# Patient Record
Sex: MALE | Race: BLACK OR AFRICAN AMERICAN | NOT HISPANIC OR LATINO | Employment: OTHER | URBAN - NONMETROPOLITAN AREA
[De-identification: names, ages, dates, MRNs, and addresses within clinical notes are randomized per-mention and may not be internally consistent; named-entity substitution may affect disease eponyms.]

---

## 2017-08-14 ENCOUNTER — OFFICE VISIT (OUTPATIENT)
Dept: CARDIAC SURGERY | Facility: CLINIC | Age: 72
End: 2017-08-14

## 2017-08-14 VITALS
SYSTOLIC BLOOD PRESSURE: 110 MMHG | DIASTOLIC BLOOD PRESSURE: 61 MMHG | WEIGHT: 191 LBS | TEMPERATURE: 97.8 F | HEIGHT: 67 IN | HEART RATE: 79 BPM | OXYGEN SATURATION: 96 % | BODY MASS INDEX: 29.98 KG/M2

## 2017-08-14 DIAGNOSIS — I70.219 ATHEROSCLEROSIS OF ARTERY OF EXTREMITY WITH INTERMITTENT CLAUDICATION (HCC): ICD-10-CM

## 2017-08-14 DIAGNOSIS — I35.9 AORTIC VALVE DISORDER: ICD-10-CM

## 2017-08-14 DIAGNOSIS — J43.1 PANLOBULAR EMPHYSEMA (HCC): ICD-10-CM

## 2017-08-14 DIAGNOSIS — E78.2 MIXED HYPERLIPIDEMIA: ICD-10-CM

## 2017-08-14 DIAGNOSIS — I65.23 BILATERAL CAROTID ARTERY STENOSIS: Primary | ICD-10-CM

## 2017-08-14 DIAGNOSIS — Z79.4 CONTROLLED TYPE 2 DIABETES MELLITUS WITH DIABETIC PERIPHERAL ANGIOPATHY WITHOUT GANGRENE, WITH LONG-TERM CURRENT USE OF INSULIN (HCC): ICD-10-CM

## 2017-08-14 DIAGNOSIS — E11.51 CONTROLLED TYPE 2 DIABETES MELLITUS WITH DIABETIC PERIPHERAL ANGIOPATHY WITHOUT GANGRENE, WITH LONG-TERM CURRENT USE OF INSULIN (HCC): ICD-10-CM

## 2017-08-14 PROCEDURE — 99204 OFFICE O/P NEW MOD 45 MIN: CPT | Performed by: THORACIC SURGERY (CARDIOTHORACIC VASCULAR SURGERY)

## 2017-08-14 RX ORDER — FERROUS SULFATE 325(65) MG
325 TABLET ORAL
COMMUNITY

## 2017-08-14 RX ORDER — DOCUSATE SODIUM 100 MG/1
100 CAPSULE, LIQUID FILLED ORAL 2 TIMES DAILY
COMMUNITY

## 2017-08-14 RX ORDER — POLYETHYLENE GLYCOL 3350 17 G/17G
17 POWDER, FOR SOLUTION ORAL DAILY
COMMUNITY

## 2017-08-14 RX ORDER — CLOPIDOGREL BISULFATE 75 MG/1
75 TABLET ORAL DAILY
COMMUNITY

## 2017-08-14 RX ORDER — OXYCODONE HYDROCHLORIDE AND ACETAMINOPHEN 5; 325 MG/1; MG/1
1 TABLET ORAL EVERY 6 HOURS PRN
COMMUNITY
End: 2017-09-28

## 2017-08-14 RX ORDER — SIMVASTATIN 40 MG
40 TABLET ORAL NIGHTLY
COMMUNITY

## 2017-08-14 RX ORDER — ALBUTEROL SULFATE 2.5 MG/3ML
2.5 SOLUTION RESPIRATORY (INHALATION) EVERY 4 HOURS PRN
COMMUNITY

## 2017-08-14 RX ORDER — BUDESONIDE 0.5 MG/2ML
0.5 INHALANT ORAL
COMMUNITY

## 2017-08-14 RX ORDER — LANOLIN ALCOHOL/MO/W.PET/CERES
1000 CREAM (GRAM) TOPICAL DAILY
COMMUNITY

## 2017-08-14 RX ORDER — FAMOTIDINE 20 MG/1
20 TABLET, FILM COATED ORAL 2 TIMES DAILY
COMMUNITY

## 2017-08-14 RX ORDER — PAROXETINE HYDROCHLORIDE 20 MG/1
20 TABLET, FILM COATED ORAL EVERY MORNING
COMMUNITY

## 2017-08-14 RX ORDER — ARFORMOTEROL TARTRATE 15 UG/2ML
SOLUTION RESPIRATORY (INHALATION)
COMMUNITY

## 2017-09-03 PROBLEM — I35.9 AORTIC VALVE DISORDER: Status: ACTIVE | Noted: 2017-09-03

## 2017-09-03 PROBLEM — I65.23 BILATERAL CAROTID ARTERY STENOSIS: Status: ACTIVE | Noted: 2017-09-03

## 2017-09-03 PROBLEM — E11.51 CONTROLLED TYPE 2 DIABETES MELLITUS WITH DIABETIC PERIPHERAL ANGIOPATHY WITHOUT GANGRENE, WITH LONG-TERM CURRENT USE OF INSULIN (HCC): Status: ACTIVE | Noted: 2017-09-03

## 2017-09-03 PROBLEM — I70.219 ATHEROSCLEROSIS OF ARTERY OF EXTREMITY WITH INTERMITTENT CLAUDICATION (HCC): Status: ACTIVE | Noted: 2017-09-03

## 2017-09-03 PROBLEM — Z79.4 CONTROLLED TYPE 2 DIABETES MELLITUS WITH DIABETIC PERIPHERAL ANGIOPATHY WITHOUT GANGRENE, WITH LONG-TERM CURRENT USE OF INSULIN (HCC): Status: ACTIVE | Noted: 2017-09-03

## 2017-09-03 PROBLEM — E78.2 MIXED HYPERLIPIDEMIA: Status: ACTIVE | Noted: 2017-09-03

## 2017-09-03 PROBLEM — J43.1 PANLOBULAR EMPHYSEMA (HCC): Status: ACTIVE | Noted: 2017-09-03

## 2017-09-04 NOTE — PROGRESS NOTES
8/14/2017    Sarbjit Kirkpatrick  1945      Chief Complaint:    Chief Complaint   Patient presents with   • Carotid Artery Disease       HPI:      PCP:  J[facility] Rivera  Cardiology:  Dr Grant  Neurology:  Dr Gaspar      72 y.o. male with HTN(stable, risk stroke), hyperlipidemia(stable, risk stroke), carotid stenosis(new, risk stroke), COPD(stable, risk procedural complications), DM2(stable, risk wound healing).  former smoker.  Moderate fatigue, lightheadedness x 6 months.. No TIA stroke amaurosis.  No MI claudication. Cholecystectomy 7/26/2017, Dr Jorgensen.  No other associated signs, symptoms or modifying factors.    1980 LEFT Carotid endarterectomy  11/8/2016 US Abdomen([facility]): gallstones, possible common duct stone.  6/2/2017 CTA Head/Neck([facility]):  ERICH occlusion.  LICA 40-50% stenosis, prior endarterectomy.  6/2/2017 MRI Brain([facility]):  Atrophy, minimal microvascular changes.  ERICH occlusion.    The following portions of the patient's history were reviewed and updated as appropriate: allergies, current medications, past family history, past medical history, past social history, past surgical history and problem list.  Recent images independently reviewed.  Available laboratory values reviewed.    PMH:  Past Medical History:   Diagnosis Date   • Carotid stenosis    • COPD (chronic obstructive pulmonary disease)    • DM2 (diabetes mellitus, type 2)    • Hyperlipidemia    • PVD (peripheral vascular disease)        ALLERGIES:  Allergies   Allergen Reactions   • Propofol          MEDICATIONS:    Current Outpatient Prescriptions:   •  albuterol (PROVENTIL) (2.5 MG/3ML) 0.083% nebulizer solution, Take 2.5 mg by nebulization Every 4 (Four) Hours As Needed for Wheezing., Disp: , Rfl:   •  arformoterol (BROVANA) 15 MCG/2ML nebulizer solution, Take  by nebulization 2 (Two) Times a Day., Disp: , Rfl:   •  aspirin 81 MG tablet, Take 81 mg by mouth Daily., Disp: , Rfl:   •  budesonide (PULMICORT) 0.5 MG/2ML nebulizer  solution, Take 0.5 mg by nebulization Daily., Disp: , Rfl:   •  clopidogrel (PLAVIX) 75 MG tablet, Take 75 mg by mouth Daily., Disp: , Rfl:   •  docusate sodium (COLACE) 100 MG capsule, Take 100 mg by mouth 2 (Two) Times a Day., Disp: , Rfl:   •  famotidine (PEPCID) 20 MG tablet, Take 20 mg by mouth 2 (Two) Times a Day., Disp: , Rfl:   •  ferrous sulfate 325 (65 FE) MG tablet, Take 325 mg by mouth Daily With Breakfast., Disp: , Rfl:   •  insulin NPH-insulin regular (NOVOLIN 70/30) (70-30) 100 UNIT/ML injection, Inject  under the skin 2 (Two) Times a Day With Meals., Disp: , Rfl:   •  metoprolol tartrate (LOPRESSOR) 25 MG tablet, Take 25 mg by mouth 2 (Two) Times a Day., Disp: , Rfl:   •  oxyCODONE-acetaminophen (PERCOCET) 5-325 MG per tablet, Take 1 tablet by mouth Every 6 (Six) Hours As Needed., Disp: , Rfl:   •  PARoxetine (PAXIL) 20 MG tablet, Take 20 mg by mouth Every Morning., Disp: , Rfl:   •  polyethylene glycol (MIRALAX) packet, Take 17 g by mouth Daily., Disp: , Rfl:   •  simvastatin (ZOCOR) 40 MG tablet, Take 40 mg by mouth Every Night., Disp: , Rfl:   •  vitamin B-12 (CYANOCOBALAMIN) 1000 MCG tablet, Take 1,000 mcg by mouth Daily., Disp: , Rfl:     Review of Systems   Review of Systems   Constitution: Positive for malaise/fatigue. Negative for night sweats and weight loss.   HENT: Negative for hearing loss, hoarse voice and stridor.    Eyes: Negative for vision loss in left eye, vision loss in right eye and visual disturbance.   Cardiovascular: Negative for chest pain, leg swelling and palpitations.   Respiratory: Negative for cough, hemoptysis and shortness of breath.    Hematologic/Lymphatic: Negative for adenopathy and bleeding problem. Bruises/bleeds easily.   Skin: Negative for color change, poor wound healing and rash.   Musculoskeletal: Negative for arthritis, back pain, muscle weakness and neck pain.   Gastrointestinal: Positive for abdominal pain. Negative for dysphagia and heartburn.    Neurological: Positive for dizziness and light-headedness. Negative for numbness and seizures.   Psychiatric/Behavioral: Negative for altered mental status, depression and memory loss. The patient is not nervous/anxious.        Physical Exam   Physical Exam   Constitutional: He is oriented to person, place, and time. He is active and cooperative. He does not appear ill. No distress.   HENT:   Head: Atraumatic.   Right Ear: Hearing normal.   Left Ear: Hearing normal.   Nose: No nasal deformity. No epistaxis.   Mouth/Throat: He does not have dentures. Normal dentition.   Eyes: Conjunctivae and lids are normal. Right pupil is round and reactive. Left pupil is round and reactive.   Neck: No JVD present. Carotid bruit is present. No tracheal deviation present. No thyroid mass and no thyromegaly present.   Cardiovascular: Normal rate and regular rhythm.    Murmur heard.   Systolic murmur is present with a grade of 3/6   Pulses:       Carotid pulses are 2+ on the right side with bruit, and 2+ on the left side with bruit.       Radial pulses are 2+ on the right side, and 2+ on the left side.        Femoral pulses are 2+ on the right side, and 2+ on the left side.       Dorsalis pedis pulses are 2+ on the right side, and 2+ on the left side.        Posterior tibial pulses are 1+ on the right side, and 1+ on the left side.   Pulmonary/Chest: Effort normal and breath sounds normal.   Abdominal: Soft. He exhibits no distension and no mass. There is no splenomegaly or hepatomegaly. There is no tenderness.   Musculoskeletal: He exhibits no deformity.   Gait normal.    Lymphadenopathy:     He has no cervical adenopathy.        Right: No supraclavicular adenopathy present.        Left: No supraclavicular adenopathy present.   Neurological: He is alert and oriented to person, place, and time. He has normal strength.   Skin: Skin is warm and dry. No cyanosis or erythema. No pallor.   No venous staining   Psychiatric: He has a  normal mood and affect. His speech is normal. Judgment and thought content normal.     BUN 15 Creat .93    ASSESSMENT:  Sarbjit was seen today for carotid artery disease.    Diagnoses and all orders for this visit:    Bilateral carotid artery stenosis    Panlobular emphysema    Mixed hyperlipidemia    Controlled type 2 diabetes mellitus with diabetic peripheral angiopathy without gangrene, with long-term current use of insulin    Atherosclerosis of artery of extremity with intermittent claudication    PLAN:  Detailed discussion with Sarbjit Kirkpatrick regarding situation and options.  Moderate carotid stenosis, asymptomatic.  Prior carotid intervention.  Multiple risk factors with severe comorbidities.  Aortic stenosis by clinical exam.  Cardiology evaluation.  No intervention indicated at this time.  Will follow with interval imaging.  Risks, benefits discussed.  Understands and wishes to proceed with plan.    Return in 6 months with Carotid Duplex  Appt Dr Grant, evaluation aortic stenosis.    Recommended regular physical activity, progressive walking program.  Continue current medications as directed.  Will Obtain relevant old records.    Thank you for the opportunity to participate in this patient's care.    Copy to primary care provider.    EMR Dragon/Transcription disclaimer:   Much of this encounter note is an electronic transcription/translation of spoken language to printed text. The electronic translation of spoken language may permit erroneous, or at times, nonsensical words or phrases to be inadvertently transcribed; Although I have reviewed the note for such errors, some may still exist.

## 2017-09-27 DIAGNOSIS — R01.1 MURMUR, CARDIAC: Primary | ICD-10-CM

## 2017-09-28 ENCOUNTER — OFFICE VISIT (OUTPATIENT)
Dept: CARDIOLOGY | Facility: CLINIC | Age: 72
End: 2017-09-28

## 2017-09-28 ENCOUNTER — APPOINTMENT (OUTPATIENT)
Dept: LAB | Facility: HOSPITAL | Age: 72
End: 2017-09-28

## 2017-09-28 ENCOUNTER — TRANSCRIBE ORDERS (OUTPATIENT)
Dept: ADMINISTRATIVE | Facility: HOSPITAL | Age: 72
End: 2017-09-28

## 2017-09-28 VITALS
DIASTOLIC BLOOD PRESSURE: 70 MMHG | SYSTOLIC BLOOD PRESSURE: 148 MMHG | HEIGHT: 68 IN | OXYGEN SATURATION: 98 % | WEIGHT: 191.7 LBS | BODY MASS INDEX: 29.05 KG/M2 | HEART RATE: 63 BPM

## 2017-09-28 DIAGNOSIS — Z87.891 FORMER SMOKER: ICD-10-CM

## 2017-09-28 DIAGNOSIS — I65.23 BILATERAL CAROTID ARTERY STENOSIS: ICD-10-CM

## 2017-09-28 DIAGNOSIS — Z87.891 FORMER SMOKER: Primary | ICD-10-CM

## 2017-09-28 DIAGNOSIS — I06.0 RHEUMATIC AORTIC STENOSIS: ICD-10-CM

## 2017-09-28 DIAGNOSIS — E78.2 MIXED HYPERLIPIDEMIA: ICD-10-CM

## 2017-09-28 DIAGNOSIS — R01.1 MURMUR, CARDIAC: Primary | ICD-10-CM

## 2017-09-28 LAB
ALBUMIN SERPL-MCNC: 3.6 G/DL (ref 3.4–4.8)
ALBUMIN/GLOB SERPL: 1 G/DL (ref 1.1–1.8)
ALP SERPL-CCNC: 72 U/L (ref 38–126)
ALT SERPL W P-5'-P-CCNC: 28 U/L (ref 21–72)
ANION GAP SERPL CALCULATED.3IONS-SCNC: 9 MMOL/L (ref 5–15)
ARTICHOKE IGE QN: 107 MG/DL (ref 1–129)
AST SERPL-CCNC: 25 U/L (ref 17–59)
BILIRUB SERPL-MCNC: 0.6 MG/DL (ref 0.2–1.3)
BUN BLD-MCNC: 14 MG/DL (ref 7–21)
BUN/CREAT SERPL: 14.9 (ref 7–25)
CALCIUM SPEC-SCNC: 9 MG/DL (ref 8.4–10.2)
CHLORIDE SERPL-SCNC: 103 MMOL/L (ref 95–110)
CHOLEST SERPL-MCNC: 193 MG/DL (ref 0–199)
CO2 SERPL-SCNC: 30 MMOL/L (ref 22–31)
CREAT BLD-MCNC: 0.94 MG/DL (ref 0.7–1.3)
DEPRECATED RDW RBC AUTO: 58.3 FL (ref 35.1–43.9)
ERYTHROCYTE [DISTWIDTH] IN BLOOD BY AUTOMATED COUNT: 17.6 % (ref 11.5–14.5)
GFR SERPL CREATININE-BSD FRML MDRD: 95 ML/MIN/1.73 (ref 42–98)
GLOBULIN UR ELPH-MCNC: 3.7 GM/DL (ref 2.3–3.5)
GLUCOSE BLD-MCNC: 211 MG/DL (ref 60–100)
HCT VFR BLD AUTO: 35.7 % (ref 39–49)
HDLC SERPL-MCNC: 47 MG/DL (ref 60–200)
HGB BLD-MCNC: 11.1 G/DL (ref 13.7–17.3)
LDLC/HDLC SERPL: 2.71 {RATIO} (ref 0–3.55)
MCH RBC QN AUTO: 28 PG (ref 26.5–34)
MCHC RBC AUTO-ENTMCNC: 31.1 G/DL (ref 31.5–36.3)
MCV RBC AUTO: 89.9 FL (ref 80–98)
PLATELET # BLD AUTO: 155 10*3/MM3 (ref 150–450)
PMV BLD AUTO: 10.1 FL (ref 8–12)
POTASSIUM BLD-SCNC: 4.8 MMOL/L (ref 3.5–5.1)
PROT SERPL-MCNC: 7.3 G/DL (ref 6.3–8.6)
RBC # BLD AUTO: 3.97 10*6/MM3 (ref 4.37–5.74)
SODIUM BLD-SCNC: 142 MMOL/L (ref 137–145)
TRIGL SERPL-MCNC: 93 MG/DL (ref 20–199)
WBC NRBC COR # BLD: 5.52 10*3/MM3 (ref 3.2–9.8)

## 2017-09-28 PROCEDURE — 80061 LIPID PANEL: CPT | Performed by: INTERNAL MEDICINE

## 2017-09-28 PROCEDURE — 36415 COLL VENOUS BLD VENIPUNCTURE: CPT | Performed by: INTERNAL MEDICINE

## 2017-09-28 PROCEDURE — 93000 ELECTROCARDIOGRAM COMPLETE: CPT | Performed by: INTERNAL MEDICINE

## 2017-09-28 PROCEDURE — 85027 COMPLETE CBC AUTOMATED: CPT | Performed by: INTERNAL MEDICINE

## 2017-09-28 PROCEDURE — 80053 COMPREHEN METABOLIC PANEL: CPT | Performed by: INTERNAL MEDICINE

## 2017-09-28 PROCEDURE — 99202 OFFICE O/P NEW SF 15 MIN: CPT | Performed by: INTERNAL MEDICINE

## 2017-09-28 RX ORDER — LOSARTAN POTASSIUM 50 MG/1
50 TABLET ORAL DAILY
COMMUNITY

## 2017-09-28 RX ORDER — AMLODIPINE BESYLATE 2.5 MG/1
5 TABLET ORAL DAILY
COMMUNITY

## 2017-09-28 RX ORDER — DORZOLAMIDE HYDROCHLORIDE AND TIMOLOL MALEATE 20; 5 MG/ML; MG/ML
1 SOLUTION/ DROPS OPHTHALMIC 2 TIMES DAILY
COMMUNITY

## 2017-09-28 RX ORDER — LATANOPROST 50 UG/ML
1 SOLUTION/ DROPS OPHTHALMIC NIGHTLY
COMMUNITY

## 2017-09-28 NOTE — PATIENT INSTRUCTIONS
Echocardiogram  An echocardiogram, or echocardiography, uses sound waves (ultrasound) to produce an image of your heart. The echocardiogram is simple, painless, obtained within a short period of time, and offers valuable information to your health care provider. The images from an echocardiogram can provide information such as:  · Evidence of coronary artery disease (CAD).  · Heart size.  · Heart muscle function.  · Heart valve function.  · Aneurysm detection.  · Evidence of a past heart attack.  · Fluid buildup around the heart.  · Heart muscle thickening.  · Assess heart valve function.  LET YOUR HEALTH CARE PROVIDER KNOW ABOUT:  · Any allergies you have.  · All medicines you are taking, including vitamins, herbs, eye drops, creams, and over-the-counter medicines.  · Previous problems you or members of your family have had with the use of anesthetics.  · Any blood disorders you have.  · Previous surgeries you have had.  · Medical conditions you have.  · Possibility of pregnancy, if this applies.  BEFORE THE PROCEDURE   No special preparation is needed. Eat and drink normally.   PROCEDURE   · In order to produce an image of your heart, gel will be applied to your chest and a wand-like tool (transducer) will be moved over your chest. The gel will help transmit the sound waves from the transducer. The sound waves will harmlessly bounce off your heart to allow the heart images to be captured in real-time motion. These images will then be recorded.  · You may need an IV to receive a medicine that improves the quality of the pictures.  AFTER THE PROCEDURE  You may return to your normal schedule including diet, activities, and medicines, unless your health care provider tells you otherwise.     This information is not intended to replace advice given to you by your health care provider. Make sure you discuss any questions you have with your health care provider.     Document Released: 12/15/2001 Document Revised:  01/08/2016 Document Reviewed: 08/25/2014  Elsevier Interactive Patient Education ©2017 [employer]

## 2017-09-28 NOTE — PROGRESS NOTES
Cardiovascular Medicine      Gerber Grant M.D., Ph.D., [facility]         No referring provider defined for this encounter.  Thank you for asking me to see Sarbjit Kirkpatrick for AS.    History of Present Illness  This is a 72 y.o. male with:    1. AS  2. SHANAE  A. S/p CEA  B. ERICH occlusion  3. HLD      AS  Patient presents as a referral from Dr. Aviles for a systolic ejection murmur concerning for aortic stenosis. The patient also had a TTE which showed AS. The valve was calcified. There was also MR and TR. He has had no dyspnea, dizziness, lightheadedness or angina.     HLD  Concerning the patient's hyperlipidemia, the patient remains on a statin.  They are tolerating this very well.  Denies side effects.  Specifically, the patient denies any prior history of asymptomatic LFT elevation, myositis or myalgias.  Patient's laboratory evaluations are followed closely by their PCP.    Review of Systems - History obtained from chart review and the patient  General ROS: negative  Respiratory ROS: no cough, shortness of breath, or wheezing  Cardiovascular ROS: no chest pain or dyspnea on exertion.  All other systems were reviewed and were negative.    family history is negative for Diabetes and Cancer.     reports that he has quit smoking. He does not have any smokeless tobacco history on file. He reports that he does not drink alcohol or use illicit drugs.    Allergies   Allergen Reactions   • Propofol          Current Outpatient Prescriptions:   •  albuterol (PROVENTIL) (2.5 MG/3ML) 0.083% nebulizer solution, Take 2.5 mg by nebulization Every 4 (Four) Hours As Needed for Wheezing., Disp: , Rfl:   •  arformoterol (BROVANA) 15 MCG/2ML nebulizer solution, Take  by nebulization 2 (Two) Times a Day., Disp: , Rfl:   •  aspirin 81 MG tablet, Take 81 mg by mouth Daily., Disp: , Rfl:   •  budesonide (PULMICORT) 0.5 MG/2ML nebulizer solution, Take 0.5 mg by nebulization Daily., Disp: , Rfl:   •  clopidogrel (PLAVIX) 75 MG  tablet, Take 75 mg by mouth Daily., Disp: , Rfl:   •  docusate sodium (COLACE) 100 MG capsule, Take 100 mg by mouth 2 (Two) Times a Day., Disp: , Rfl:   •  famotidine (PEPCID) 20 MG tablet, Take 20 mg by mouth 2 (Two) Times a Day., Disp: , Rfl:   •  ferrous sulfate 325 (65 FE) MG tablet, Take 325 mg by mouth Daily With Breakfast., Disp: , Rfl:   •  insulin NPH-insulin regular (NOVOLIN 70/30) (70-30) 100 UNIT/ML injection, Inject  under the skin 2 (Two) Times a Day With Meals., Disp: , Rfl:   •  metoprolol tartrate (LOPRESSOR) 25 MG tablet, Take 25 mg by mouth 2 (Two) Times a Day., Disp: , Rfl:   •  oxyCODONE-acetaminophen (PERCOCET) 5-325 MG per tablet, Take 1 tablet by mouth Every 6 (Six) Hours As Needed., Disp: , Rfl:   •  PARoxetine (PAXIL) 20 MG tablet, Take 20 mg by mouth Every Morning., Disp: , Rfl:   •  polyethylene glycol (MIRALAX) packet, Take 17 g by mouth Daily., Disp: , Rfl:   •  simvastatin (ZOCOR) 40 MG tablet, Take 40 mg by mouth Every Night., Disp: , Rfl:   •  vitamin B-12 (CYANOCOBALAMIN) 1000 MCG tablet, Take 1,000 mcg by mouth Daily., Disp: , Rfl:     Physical Exam:  Vitals:    09/28/17 1010   BP: 148/70   Pulse: 63   SpO2: 98%     Body mass index is 29.58 kg/(m^2).    GEN: alert, appears stated age and cooperative  Body Habitus: obese  Neuro: CN II-XII grossly intact.   HEENT: Head: Normocephalic, no lesions, without obvious abnormality.  Neck / Thyroid: Supple, no masses, nodes, nodules or enlargement. No arcus senilis, xanthelasma or xanthomas. PERRL. Normal external ears. No drainage. No thyromegaly. Neck supple. No LAD. Trachea midline. Nose, normal.  JVP: 7 cm of water at 45 degrees HJR: absent      Carotid:  Upstroke: easily palpated bilaterally Volume: Normal.    Carotid Bruit:  None  Subclavian Bruit: Not present.    Lymph: No overt LAD.   Back: Normal.  Chest:  Normal Excursion: Good    I:E: Good  Pulmonary:clear to auscultation, no wheezes, rales or rhonchi, symmetric air entry. Equal  chest excursion. Chest physical exam is normal. No tenderness.        Precordium:  No palpable heaves or thrusts. P2 is not palpable.   [place]:  normal size and placement Palpable S4: Not present.   Heart rate: normal  Heart Rhythm: regular     Heart Sounds: S1: normal  S2: normal intensity  S3: absent   S4: present  Opening Snap: absent  A2-OS:  N/A  Pericardial rub: absent    Ejection click: None      Murmurs: Systolic: 2/6 systolic at base  Diastolic: none  Abdomen: Deferred  Extremity: no edema, cyanosis  Pulses: Right radial artery has 2+ (normal) pulse and Left radial artery has 2+ (normal) pulse    DATA REVIEWED:     Notes reviewed from Dr. Aviles showing SHANAE status post CEA.    EKG: Sinus with PAC.    Assessment/Plan      1. AS.  -TTE    2. Cardiac Risk Assessment and need for statin therapy: Moderate Risk.   -Statin:  Yes.  -Recommended moderate-intensity statin therapy  -Lipid-lowering medications: Zocor (simvastatin)  -Recommended ASA    3. Tobacco status: Former smoker.    4. AAA screening. The patient meets criteria for screening due to former smoker and current age  -Aorta Duplex    5. SHANAE: The patient is Asymptomatic.   -ASA, Statin    Plan for follow-up: in 1 month           This document has been electronically signed by Gerber Grant MD PhD on September 28, 2017 10:24 AM

## 2017-10-06 LAB
BH CV ECHO MEAS - ACS: 2.2 CM
BH CV ECHO MEAS - AO ISTHMUS: 2.7 CM
BH CV ECHO MEAS - AO MAX PG (FULL): 6.1 MMHG
BH CV ECHO MEAS - AO MAX PG: 8.5 MMHG
BH CV ECHO MEAS - AO MEAN PG (FULL): 3.5 MMHG
BH CV ECHO MEAS - AO MEAN PG: 4.5 MMHG
BH CV ECHO MEAS - AO ROOT AREA: 10.2 CM^2
BH CV ECHO MEAS - AO ROOT DIAM: 3.6 CM
BH CV ECHO MEAS - AO V2 MAX: 145.5 CM/SEC
BH CV ECHO MEAS - AO V2 MEAN: 102.5 CM/SEC
BH CV ECHO MEAS - AO V2 VTI: 38.1 CM
BH CV ECHO MEAS - ASC AORTA: 3.1 CM
BH CV ECHO MEAS - AVA(I,A): 1.7 CM^2
BH CV ECHO MEAS - AVA(I,D): 1.7 CM^2
BH CV ECHO MEAS - AVA(V,A): 1.7 CM^2
BH CV ECHO MEAS - AVA(V,D): 1.7 CM^2
BH CV ECHO MEAS - EDV(CUBED): 74.1 ML
BH CV ECHO MEAS - EDV(TEICH): 78.6 ML
BH CV ECHO MEAS - EF(CUBED): 70.4 %
BH CV ECHO MEAS - EF(TEICH): 62.4 %
BH CV ECHO MEAS - ESV(CUBED): 22 ML
BH CV ECHO MEAS - ESV(TEICH): 29.6 ML
BH CV ECHO MEAS - FS: 33.3 %
BH CV ECHO MEAS - IVS/LVPW: 1
BH CV ECHO MEAS - IVSD: 1 CM
BH CV ECHO MEAS - LA DIMENSION: 4.1 CM
BH CV ECHO MEAS - LA/AO: 1.1
BH CV ECHO MEAS - LV MASS(C)D: 137.2 GRAMS
BH CV ECHO MEAS - LV MAX PG: 2.4 MMHG
BH CV ECHO MEAS - LV MEAN PG: 1 MMHG
BH CV ECHO MEAS - LV V1 MAX: 76.7 CM/SEC
BH CV ECHO MEAS - LV V1 MEAN: 54.2 CM/SEC
BH CV ECHO MEAS - LV V1 VTI: 21.1 CM
BH CV ECHO MEAS - LVIDD: 4.2 CM
BH CV ECHO MEAS - LVIDS: 2.8 CM
BH CV ECHO MEAS - LVOT AREA (M): 3.1 CM^2
BH CV ECHO MEAS - LVOT AREA: 3.1 CM^2
BH CV ECHO MEAS - LVOT DIAM: 2 CM
BH CV ECHO MEAS - LVPWD: 1 CM
BH CV ECHO MEAS - MR MAX PG: 77.4 MMHG
BH CV ECHO MEAS - MR MAX VEL: 440 CM/SEC
BH CV ECHO MEAS - MV A MAX VEL: 91.1 CM/SEC
BH CV ECHO MEAS - MV DEC SLOPE: 730 CM/SEC^2
BH CV ECHO MEAS - MV E MAX VEL: 127 CM/SEC
BH CV ECHO MEAS - MV E/A: 1.4
BH CV ECHO MEAS - MV MAX PG: 7.3 MMHG
BH CV ECHO MEAS - MV MEAN PG: 3 MMHG
BH CV ECHO MEAS - MV P1/2T MAX VEL: 141 CM/SEC
BH CV ECHO MEAS - MV P1/2T: 56.6 MSEC
BH CV ECHO MEAS - MV V2 MAX: 135 CM/SEC
BH CV ECHO MEAS - MV V2 MEAN: 78.5 CM/SEC
BH CV ECHO MEAS - MV V2 VTI: 38.7 CM
BH CV ECHO MEAS - MVA P1/2T LCG: 1.6 CM^2
BH CV ECHO MEAS - MVA(P1/2T): 3.9 CM^2
BH CV ECHO MEAS - MVA(VTI): 1.7 CM^2
BH CV ECHO MEAS - PA MAX PG: 3.3 MMHG
BH CV ECHO MEAS - PA V2 MAX: 90.9 CM/SEC
BH CV ECHO MEAS - RAP SYSTOLE: 5 MMHG
BH CV ECHO MEAS - RVDD: 2.5 CM
BH CV ECHO MEAS - RVSP: 43.7 MMHG
BH CV ECHO MEAS - SV(AO): 387.8 ML
BH CV ECHO MEAS - SV(CUBED): 52.1 ML
BH CV ECHO MEAS - SV(LVOT): 66.1 ML
BH CV ECHO MEAS - SV(TEICH): 49 ML
BH CV ECHO MEAS - TR MAX VEL: 311 CM/SEC

## 2017-11-14 ENCOUNTER — OFFICE VISIT (OUTPATIENT)
Dept: CARDIOLOGY | Facility: CLINIC | Age: 72
End: 2017-11-14

## 2017-11-14 VITALS
OXYGEN SATURATION: 93 % | SYSTOLIC BLOOD PRESSURE: 148 MMHG | HEART RATE: 93 BPM | BODY MASS INDEX: 30.42 KG/M2 | DIASTOLIC BLOOD PRESSURE: 70 MMHG | HEIGHT: 68 IN | WEIGHT: 200.7 LBS

## 2017-11-14 DIAGNOSIS — I34.0 NON-RHEUMATIC MITRAL REGURGITATION: ICD-10-CM

## 2017-11-14 DIAGNOSIS — E78.2 MIXED HYPERLIPIDEMIA: ICD-10-CM

## 2017-11-14 DIAGNOSIS — I35.0 NONRHEUMATIC AORTIC VALVE STENOSIS: Primary | ICD-10-CM

## 2017-11-14 PROCEDURE — 99213 OFFICE O/P EST LOW 20 MIN: CPT | Performed by: INTERNAL MEDICINE

## 2017-11-14 NOTE — PROGRESS NOTES
Cardiovascular Medicine      Gerber Grant M.D., Ph.D., [facility]           History of Present Illness  This is a 72 y.o. male with:    1. Mild AS  2. Mild MR  3. SHANAE  A. S/p CEA  B. ERICH occlusion  4. HLD      Valve Disease  Sarbjit Kirkpatrick is a 72 y.o. male who presents for follow-up of aortic valve and mitral valve disease.  Current status: Mild aortic stenosis due to valve calcification and Mild mitral insufficiency Due to poor coaptation..  The patient has not had valve surgery.  The patient does not have a history of rheumatic fever. The patient does not need endocarditis prophylaxis.      HLD  Concerning the patient's hyperlipidemia, the patient remains on a statin.  They are tolerating this very well.  Denies side effects.  Specifically, the patient denies any prior history of asymptomatic LFT elevation, myositis or myalgias.  Patient's laboratory evaluations are followed closely by their PCP.    Review of Systems - History obtained from chart review and the patient  General ROS: negative  Respiratory ROS: no cough, shortness of breath, or wheezing  Cardiovascular ROS: no chest pain or dyspnea on exertion.  All other systems were reviewed and were negative.    family history includes Cancer in his father; Diabetes in his brother; Heart attack in his mother; Kidney disease in his brother.     reports that he has quit smoking. He has never used smokeless tobacco. He reports that he does not drink alcohol or use illicit drugs.    Allergies   Allergen Reactions   • Propofol          Current Outpatient Prescriptions:   •  albuterol (PROVENTIL) (2.5 MG/3ML) 0.083% nebulizer solution, Take 2.5 mg by nebulization Every 4 (Four) Hours As Needed for Wheezing., Disp: , Rfl:   •  amLODIPine (NORVASC) 2.5 MG tablet, Take 2.5 mg by mouth Daily., Disp: , Rfl:   •  arformoterol (BROVANA) 15 MCG/2ML nebulizer solution, Take  by nebulization 2 (Two) Times a Day., Disp: , Rfl:   •  aspirin 81 MG tablet, Take 81 mg by  mouth Daily., Disp: , Rfl:   •  budesonide (PULMICORT) 0.5 MG/2ML nebulizer solution, Take 0.5 mg by nebulization Daily., Disp: , Rfl:   •  clopidogrel (PLAVIX) 75 MG tablet, Take 75 mg by mouth Daily., Disp: , Rfl:   •  docusate sodium (COLACE) 100 MG capsule, Take 100 mg by mouth 2 (Two) Times a Day., Disp: , Rfl:   •  dorzolamide-timolol (COSOPT) 22.3-6.8 MG/ML ophthalmic solution, 1 drop 2 (Two) Times a Day., Disp: , Rfl:   •  famotidine (PEPCID) 20 MG tablet, Take 20 mg by mouth 2 (Two) Times a Day., Disp: , Rfl:   •  ferrous sulfate 325 (65 FE) MG tablet, Take 325 mg by mouth Daily With Breakfast., Disp: , Rfl:   •  insulin NPH-insulin regular (NOVOLIN 70/30) (70-30) 100 UNIT/ML injection, Inject  under the skin 2 (Two) Times a Day With Meals., Disp: , Rfl:   •  latanoprost (XALATAN) 0.005 % ophthalmic solution, 1 drop Every Night., Disp: , Rfl:   •  losartan (COZAAR) 50 MG tablet, Take 50 mg by mouth Daily., Disp: , Rfl:   •  metoprolol tartrate (LOPRESSOR) 25 MG tablet, Take 25 mg by mouth 2 (Two) Times a Day., Disp: , Rfl:   •  PARoxetine (PAXIL) 20 MG tablet, Take 20 mg by mouth Every Morning., Disp: , Rfl:   •  polyethylene glycol (MIRALAX) packet, Take 17 g by mouth Daily., Disp: , Rfl:   •  simvastatin (ZOCOR) 40 MG tablet, Take 40 mg by mouth Every Night., Disp: , Rfl:   •  vitamin B-12 (CYANOCOBALAMIN) 1000 MCG tablet, Take 1,000 mcg by mouth Daily., Disp: , Rfl:     Physical Exam:  Vitals:    11/14/17 1256   BP: 148/70   Pulse: 93   SpO2: 93%     Body mass index is 30.97 kg/(m^2).    GEN: alert, appears stated age and cooperative  Body Habitus: obese  JVP: 7 cm of water at 45 degrees HJR: absent      I:E: Good  Heart rate: normal  Heart Rhythm: regular     Heart Sounds: S1: normal  S2: normal intensity  S3: absent   S4: present  Opening Snap: absent  A2-OS:  N/A  Pericardial rub: absent    Ejection click: None      Murmurs: Systolic: 1/6 systolic at base  Diastolic: none  Pulses: Right radial artery  "has 2+ (normal) pulse and Left radial artery has 2+ (normal) pulse    DATA REVIEWED:     [facility]  [address] [place] [address] [phone]             Sarbjit Kirkpatrick   Echocardiogram   Order# [id number]   Reading physician: Gerber Grant MD PhD Ordering physician: Gerber Grant MD PhD Study date: 10/6/17   Patient Information   Patient Name MRN Sex  (Age)   Sarbjit Kirkpatrick [id number] Male 1945 (72 y.o.)   Sedation Narrator Report   Sedation Narrator Report   Interpretation Summary   · Left Ventricle: Left ventricular systolic function is normal. Estimated EF appears to be in the range of 61 - 65%  · Left ventricular wall thickness is consistent with mild concentric hypertrophy  · Left ventricular diastolic function is normal  · Right Ventricle: Normal right ventricular cavity size, wall thickness, systolic function and septal motion noted  · Aortic Valve: There is calcification of the aortic valve.There is thickening of the aortic valve. Aortic dimensionless index is approaching criteria for mild aortic stenosis.  · Mitral Valve: The mitral valve is abnormal in structure. Moderate mitral annular calcification is present. Mild mitral valve regurgitation is present  · Mild tricuspid valve regurgitation is present  · Mild elevation of the PA systolic pressure but not meeting criteria for pulmonary hypertension  · There is no evidence of pericardial effusion  · Left ventricular systolic function is normal.  · Left ventricular wall thickness is consistent with mild concentric hypertrophy.       Patient Height & Weight   Height Weight BSA (Calculated - sq m) BMI (kg/m2) Pulse   67.5\" (171.5 cm) 191 lb 11.2 oz (87 kg) 2 sq meters 29.64 63   Patient Vitals   BP Pulse   148/70 63   Blood Pressures    Right Arm Left Arm   Blood Pressure           Reason For Exam   Murmur or Click; Suspicion of Valvular Heart Disease   Dx: Murmur, cardiac " [R01.1 (ICD-10-CM)]; Rheumatic aortic stenosis [I06.0 (ICD-10-CM)]   Cardiac History   Diagnosis Date Comment   Carotid stenosis     DM2 (diabetes mellitus, type 2)     Hyperlipidemia     Study Description   2D Echocardiogram complete w/Doppler,Color,MMode.  The study is technically adequate for diagnosis.   Echocardiogram Findings   Left Ventricle  Left ventricular systolic function is normal. Estimated EF appears to be in the range of 61 - 65%. Normal left ventricular cavity size noted. All left ventricular wall segments contract normally. Left ventricular wall thickness is consistent with mild concentric hypertrophy. Septal wall motion is normal. Left ventricular diastolic function is normal. Normal left atrial pressure. There is no evidence of a left ventricular mass or thrombus present.   Right Ventricle  Normal right ventricular cavity size, wall thickness, systolic function and septal motion noted. No evidence of a right ventricular thrombus present. No evidence of a right ventricular mass present.   Left Atrium  Left atrial cavity size is dilated.   Right Atrium  Normal right atrial size noted.   Aortic Valve  The aortic valve is abnormal in structure. There is calcification of the aortic valve.There is thickening of the aortic valve. No aortic valve regurgitation is present.   Mitral Valve  The mitral valve is abnormal in structure. Moderate mitral annular calcification is present. Mild mitral valve regurgitation is present.   Tricuspid Valve  The tricuspid valve is normal. Mild tricuspid valve regurgitation is present. Estimated right ventricular systolic pressure from tricuspid regurgitation is mildly elevated (35-45 mmHg). No evidence of pulmonary hypertension is present.   Pulmonic Valve  The pulmonic valve is structurally normal.   Greater Vessels  No dilation of the aortic root is present. No dilation of the sinuses of Valsalva is present.   Pericardium  The pericardium is normal. There is no evidence  of pericardial effusion.      Wall Scoring   Score Index: 1.000 Percent Normal: 100.0%             The left ventricular wall motion is normal.               LV Measurements   Dimensions   LVIDd 4.2 cm      LVIDs 2.8 cm      IVSd 1.0 cm      LVPWd 1.0 cm      FS 33.3 %      IVS/LVPW 1.0       ESV(cubed) 22.0 ml      EDV(cubed) 74.1 ml      LVOT area 3.1 cm^2      Diastolic Filling   MV E/A 1.4       MV E max emre 127.0 cm/sec      MV A max emre 91.1 cm/sec       Dimensions   LV mass(C)d 137.2 grams      LVOT diam 2.0 cm      Shunt Ratio   SV(LVOT) 66.1 ml         LA Measurements   Measurements   LA dimension 4.1 cm      LA/Ao 1.1          Aortic Valve Measurements   Stenosis   LVOT diam 2.0 cm      LV V1 max 76.7 cm/sec      LV V1 max PG 2.4 mmHg      LV V1 mean PG 1.0 mmHg      LV V1 VTI 21.1 cm      Ao pk emre 145.5 cm/sec       Stenosis   Ao max PG 8.5 mmHg      Ao mean PG 4.5 mmHg      Ao V2 VTI 38.1 cm      SARABJIT(I,D) 1.7 cm^2         Mitral Valve Measurements   Stenosis   MV V2 max 135.0 cm/sec      MV max PG 7.3 mmHg      MV mean PG 3.0 mmHg      MV V2 VTI 38.7 cm      MV P1/2t 56.6 msec      MVA(P1/2t) 3.9 cm^2      MVA(VTI) 1.7 cm^2      MV dec slope 730.0 cm/sec^2       Regurgitation   MR max emre 440.0 cm/sec      MR max PG 77.4 mmHg         Tricuspid Valve Measurements   TR max emre 311.0 cm/sec      RVSP(TR) 43.7 mmHg      RAP systole 5.0 mmHg         Pulmonic Valve Measurements   PA V2 max 90.9 cm/sec      PA max PG 3.3 mmHg         Greater Vessels Measurements   Ao root diam 3.6 cm      ACS 2.2 cm         Study Information   Physician Technologist Supporting Staff    Salvador Bhakta    PACS Images   Show images for Adult Transthoracic Echo Complete W/ Cont if Necessary Per Protocol   Moderate Sedation Charge Report   Open Hard Copy Result Report (Order #[id number] - Adult Transthoracic Echo Complete W/ Cont if Necessary Per Protocol)   Signed   Electronically signed by Gerber Grant MD PhD on 10/6/17 at  1050 CDT   Electronically addended by Woodrow Grant MD PhD on 10/6/17 at 1052 CDT   Printable Result Report   Result Report    Encounter   View Encounter      Results Routing Tracking   View Results Routing Information   Adult Transthoracic Echo Complete W/ Cont if Necessary Per Protocol [ECH10] (Order [id number])   Echocardiography   Date: 9/28/2017 Department: [facility] CARDIOLOGY Ordering/Authorizing: Woodrow Grant MD PhD   Procedure Abnormality Status   Adult Transthoracic Echo Complete W/ Cont if Necessary Per Protocol     Order History   Outpatient   Date/Time Action Taken User Additional Information    0000 Result Salvador RAMIREZ Yony In process   09/28/17 0946 Pend Woodrow Grant MD PhD    09/28/17 1024 Sign Woodrow Grant MD PhD    10/06/17 1038 Result Interface, Rad Results [place] In In process   10/06/17 1040 Result Salvador RAMIREZ Yony In process   10/06/17 1047 Result Woodrow Grant MD PhD Final-Edited   10/06/17 1047 Result Woodrow Grant MD PhD Final   Order Details   Frequency Duration Priority Order Class   None None Routine Hospital Performed   Start Date/Time   Start Date   09/28/17   Order Questions   Question Answer Comment   Reason for exam? Murmur or Click    Murmur or Click specification? Suspicion of Valvular Heart Disease    Procedures Performed   Code Procedure Name   ECH26 ECHO COMPLETE W/ DOPPLER AND COLOR FLOW   Source Order Set / Preference List   Preference List   WOODROW GRANT'S ([id number]) ORDERS PREFERENCE OP [[id number]]   Clinical Indications      ICD-10-CM ICD-9-CM   Murmur, cardiac  - Primary     R01.1 785.2   Rheumatic aortic stenosis     I06.0 395.0   Reprint Order Requisition   ADULT TRANSTHORACIC ECHO COMPLETE W/ CONT IF NECESSARY PER PROTOCOL (Order #[id number]) on 9/28/17   Encounter-Level Cardiology Documents:   There are no encounter-level cardiology documents.   Encounter   View Encounter   Appointments for this  Order   10/6/2017 9:00 AM - 60 min [facility] ECHO ROOM (Resource) [facility] Mad Card Proc   Order Provider Info       Office phone Pager E-mail   Ordering User Gerber Grant MD PhD [phone] -- --   Authorizing Provider Gerber Grant MD PhD [phone] -- --   Billing Provider Gerber Grant MD PhD [phone] -- --   Pended By (on 09/28/2017 0946) Gerber Grant MD PhD [phone] -- --   Linked Charges   Charge Code Quantity Modifiers Diagnosis   HC ECHO 2D COMP W SPEC COLR DOPL ADULT WO CONTRAST  68377 1      OK ECHO HEART XTHORACIC,COMPLETE W DOPPLER  53581 1      OK ECHO HEART XTHORACIC,COMPLETE W DOPPLER  39772 1      Order Transmittal Tracking   ADULT TRANSTHORACIC ECHO COMPLETE W/ CONT IF NECESSARY PER PROTOCOL (Order #[id number]) on 9/28/17   Authorized by:  Gerber Grant MD PhD  (NPI: [id number])       Reviewed By List   Gerber Grant MD PhD on 10/6/2017 10:53 AM         Assessment/Plan      1. Mild AS/MR. NYHA stage B.  -Annual Appointment for physical exam  -Surveillance TTE 2020    2. Cardiac Risk Assessment and need for statin therapy: Moderate Risk.   -Recommended moderate-intensity statin therapy  -Lipid-lowering medications: Zocor (simvastatin)  -Recommended ASA    3. Tobacco status: Former smoker.    4. AAA screening, negative 2017    Follow-up: One year

## 2017-11-14 NOTE — PATIENT INSTRUCTIONS
Mitral Valve Regurgitation  Mitral valve regurgitation, also called mitral regurgitation, is when blood leaks from the mitral valve. The mitral valve is located between the upper left chamber of the heart (left atrium) and the lower left chamber of the heart (left ventricle). Normally, this valve opens when the atrium pumps blood into the ventricle, and it closes when the ventricle pumps blood out to the body.  Mitral valve regurgitation happens when the mitral valve does not close properly. As a result, blood in the ventricle leaks back into the atrium. Mitral valve regurgitation causes the heart to work harder to pump blood. Over time, this can lead to heart failure.  CAUSES   Causes of mitral valve regurgitation include:  · Damage to the mitral valve, such as from a birth defect or heart attack.  · Infection.  · Heart disease.  · Mitral valve prolapse.  RISK FACTORS  You are more likely to develop mitral valve regurgitation if you have:  · Mitral valve prolapse.  · A heart valve infection.  · High blood pressure.  · Coronary or rheumatic heart disease.  · Swelling in your left ventricle.  · Marfan syndrome.  · Untreated syphilis.  You are also more likely to develop the condition if you have taken certain diet pills in the past.  SIGNS AND SYMPTOMS  If the condition is mild, you may not have symptoms. If you do have symptoms, they can include:  · Shortness of breath with physical activity, like climbing stairs.  · Fast or irregular heartbeat.  · Cough.  · Excessive urination, especially at night.  · Heavy breathing.  · Extreme tiredness.  · Lightheadedness.  DIAGNOSIS  To diagnose mitral valve regurgitation, your health care provider will listen to your heart for an abnormal heart sound (murmur). Your health care provider may also order tests, such as:  · An echocardiogram.  · A CT scan.  · An MRI.  TREATMENT   Treatment may include:  · Medicines. These may be given to treat symptoms and prevent  complications.  · Surgery to repair or replace the mitral valve. This may be done if:    Your heart becomes larger than normal.    Your heart cannot pump blood well.    Your symptoms get worse.  HOME CARE INSTRUCTIONS   · Take medicines only as directed by your health care provider.  · Eat a heart-healthy diet. A dietitian can help you to plan meals.  · Do not use any tobacco products, including cigarettes, chewing tobacco, and electronic cigarettes. If you need help quitting, ask your health care provider.  · Work closely with your health care provider to manage lasting conditions, such as diabetes and high blood pressure.  · Maintain a healthy weight and stay physically active. Ask your health care provider to recommend some activities that are safe for you to do.  · Limit alcohol intake to no more than 1 drink per day for nonpregnant women and 2 drinks per day for men. One drink equals 12 ounces of beer, 5 ounces of wine, or 1½ ounces of hard liquor.  · Try to get at least 7 hours of sleep each night.  · Find ways to manage stress.  SEEK IMMEDIATE MEDICAL CARE IF:  · You have shortness of breath.  · You develop chest pain.  · You sweat.  · You feel nauseous.  · You have swelling in your hands, feet, ankles, or abdomen that is getting worse.  · You have a feeling of fullness in your abdomen.  · You lose your appetite.  · You feel dizzy or unsteady.  · You have blurred vision or a headache.  · Any of your symptoms begin to get worse.  · You develop muscle aches, chills, or fever.  · You feel ill.     This information is not intended to replace advice given to you by your health care provider. Make sure you discuss any questions you have with your health care provider.     Document Released: 03/07/2006 Document Revised: 04/10/2017 Document Reviewed: 05/20/2015  [employer] Interactive Patient Education ©2017 [employer]  Aortic Valve Stenosis  Aortic valve stenosis is a narrowing of the aortic valve. The aortic valve  opens and closes to regulate blood flow between the lower left chamber of the heart (left ventricle) and the blood vessel that leads away from the heart (aorta). When the aortic valve becomes narrow, it makes it difficult for the heart to pump blood into the aorta, which causes the heart to work harder. The extra work can weaken the heart over time.  Aortic valve stenosis can range from mild to severe. If untreated, it can become more severe over time and can lead to heart failure.  CAUSES  This condition may be caused by:  · Buildup of calcium around and on the valve. This can occur with aging. This is the most common cause of aortic valve stenosis.  · Birth defect.  · Rheumatic fever.  · Radiation to the chest.  RISK FACTORS  You may be more likely to develop this condition if:  · You are over the age of 65.  · You were born with an abnormal bicuspid valve.  SYMPTOMS  You may have no symptoms until your condition becomes severe. It may take 10-20 years for mild or moderate aortic valve stenosis to become severe. Symptoms may include:  · Shortness of breath. This may get worse during physical activity.  · Feeling unusually weak and tired (fatigue).  · Extreme discomfort in the chest, neck, or arm (angina).  · A heartbeat that is irregular or faster than normal (palpitations).  · Dizziness or fainting. This may happen when you get physically tired or after you take certain heart medicines, such as nitroglycerin.  DIAGNOSIS  This condition may be diagnosed with:  · A physical exam.  · Echocardiogram. This is a type of imaging test that uses sound waves (ultrasound) to make an image of your heart. There are two types that may be used:    Transthoracic echocardiogram (TTE). This type of echocardiogram is noninvasive, and it is usually done first.    Transesophageal echocardiogram (URSULA). This type of echocardiogram is done by passing a flexible tube down your esophagus. The heart and the esophagus are close to each  other, so your health care provider can take very clear, detailed pictures of the heart using this type of test.  · Cardiac catheterization. In this procedure, a thin, flexible tube (catheter) is passed through a large vein in your neck, groin, or arm. This procedure provides information about arteries, structures, blood pressure, and oxygen levels in your heart.  · Electrocardiogram (ECG). This records the electrical impulses of your heart and assesses heart function.  · Stress tests. These are tests that evaluate the blood supply to your heart and your heart's response to exercise.  · Blood tests.  You may work with a health care provider who specializes in the heart (cardiologist).  TREATMENT  Treatment depends on how severe your condition is and what your symptoms are. You will need to have your heart checked regularly to make sure that your condition is not getting worse or causing serious problems. If your condition is mild, no treatment may be needed.  Treatment may include:  · Medicines that help keep your heart rate regular.  · Medicines that thin your blood (anticoagulants) to prevent the formation of blood clots.  · Antibiotic medicines to help prevent infection.  · Surgery to replace your aortic valve. This is the most common treatment for aortic valve stenosis. Several types of surgeries are available. The surgery may be done through a large incision over your heart (open heart surgery), or it may be done using a minimally invasive technique (transcatheter aortic valve replacement, or TAVR).  HOME CARE INSTRUCTIONS  Lifestyle  · Limit alcohol intake to no more than 1 drink per day for nonpregnant women and 2 drinks per day for men. One drink equals 12 oz of beer, 5 oz of wine, or 1½ oz of hard liquor.  · Do not use any tobacco products, such as cigarettes, chewing tobacco, or e-cigarettes. If you need help quitting, ask your health care provider.  · Work with your health care provider to manage your  blood pressure and cholesterol.  · Maintain a healthy weight.  Eating and Drinking  · Follow instructions from your health care provider about eating or drinking restrictions.    Limit how much caffeine you drink. Caffeine can affect your heart's rate and rhythm.  · Drink enough fluid to keep your urine clear or pale yellow.  · Eat a heart-healthy diet. This should include plenty of fresh fruits and vegetables. If you eat meat, it should be lean cuts. Avoid foods that are:    High in salt, saturated fat, or sugar.    Canned or highly processed.    Fried.  Activity  · Return to your normal activities as told by your health care provider. Ask your health care provider what activities are safe for you.  · Exercise regularly, as told by your health care provider. Ask your health care provider what types of exercise are safe for you.  · If your aortic valve stenosis is mild, you may need to avoid only very intense physical activity. The more severe your aortic valve stenosis is, the more activities you may need to avoid.  General Instructions  · Take over-the-counter and prescription medicines only as told by your health care provider.  · If you are a woman and you plan to become pregnant, talk with your health care provider before you become pregnant.  · Tell all health care providers who care for you that you have aortic valve stenosis.  · Keep all follow-up visits as told by your health care provider. This is important.  SEEK MEDICAL CARE IF:  · You have a fever.  SEEK IMMEDIATE MEDICAL CARE IF:  · You develop chest pain or tightness.  · You develop shortness of breath or difficulty breathing.  · You feel light-headed.  · You feel like you might faint.  · Your heartbeat is irregular or faster than normal.     These symptoms may represent a serious problem that is an emergency. Do not wait to see if the symptoms will go away. Get medical help right away. Call your local emergency services (911 in the [place] Do not  drive yourself to the hospital.     This information is not intended to replace advice given to you by your health care provider. Make sure you discuss any questions you have with your health care provider.     Document Released: 09/15/2004 Document Revised: 04/10/2017 Document Reviewed: 11/20/2016  [employer] Interactive Patient Education ©2017 [employer]

## 2021-03-01 ENCOUNTER — TRANSCRIBE ORDERS (OUTPATIENT)
Dept: PODIATRY | Facility: CLINIC | Age: 76
End: 2021-03-01

## 2021-03-01 DIAGNOSIS — E11.9 ENCOUNTER FOR DIABETIC FOOT EXAM (HCC): Primary | ICD-10-CM

## 2021-03-30 ENCOUNTER — OFFICE VISIT (OUTPATIENT)
Dept: PODIATRY | Facility: CLINIC | Age: 76
End: 2021-03-30

## 2021-03-30 VITALS — HEIGHT: 68 IN | OXYGEN SATURATION: 99 % | WEIGHT: 200 LBS | HEART RATE: 74 BPM | BODY MASS INDEX: 30.31 KG/M2

## 2021-03-30 DIAGNOSIS — M79.674 CHRONIC TOE PAIN, BILATERAL: ICD-10-CM

## 2021-03-30 DIAGNOSIS — G89.29 CHRONIC TOE PAIN, BILATERAL: ICD-10-CM

## 2021-03-30 DIAGNOSIS — E11.9 ENCOUNTER FOR DIABETIC FOOT EXAM (HCC): Primary | ICD-10-CM

## 2021-03-30 DIAGNOSIS — M79.675 CHRONIC TOE PAIN, BILATERAL: ICD-10-CM

## 2021-03-30 DIAGNOSIS — E11.42 TYPE 2 DIABETES MELLITUS WITH PERIPHERAL NEUROPATHY (HCC): ICD-10-CM

## 2021-03-30 DIAGNOSIS — B35.1 ONYCHOMYCOSIS: ICD-10-CM

## 2021-03-30 DIAGNOSIS — Z79.01 ANTICOAGULANT LONG-TERM USE: ICD-10-CM

## 2021-03-30 PROCEDURE — 11721 DEBRIDE NAIL 6 OR MORE: CPT | Performed by: PODIATRIST

## 2021-03-30 PROCEDURE — 99203 OFFICE O/P NEW LOW 30 MIN: CPT | Performed by: PODIATRIST

## 2021-03-30 RX ORDER — RIVAROXABAN 20 MG/1
20 TABLET, FILM COATED ORAL EVERY EVENING
COMMUNITY
Start: 2021-03-27

## 2021-03-30 RX ORDER — ERGOCALCIFEROL 1.25 MG/1
50000 CAPSULE ORAL WEEKLY
COMMUNITY
Start: 2021-03-15

## 2021-03-30 RX ORDER — SPIRONOLACTONE 25 MG/1
25 TABLET ORAL DAILY
COMMUNITY
Start: 2021-03-15

## 2021-03-30 RX ORDER — SYRINGE AND NEEDLE,INSULIN,1ML 31 GX5/16"
SYRINGE, EMPTY DISPOSABLE MISCELLANEOUS SEE ADMIN INSTRUCTIONS
COMMUNITY
Start: 2021-01-16

## 2021-03-30 RX ORDER — ATORVASTATIN CALCIUM 40 MG/1
40 TABLET, FILM COATED ORAL DAILY
COMMUNITY
Start: 2021-03-15

## 2021-03-30 NOTE — PROGRESS NOTES
Sarbjit Kirkpatrick  1945  76 y.o. male   PCP: Lenka Guzman 2/22/21  BS: 149  PER PATIENT     Patient came to clinic for diabetic foot care     03/30/2021    Chief Complaint   Patient presents with   • Left Foot - diabetic foot care   • Right Foot - diabetic foot care       History of Present Illness    Sarbjit Kirkpatrick is a 76 y.o.male who presents to clinic today for diabetic foot exam and care.  Patient relates to being a type II diabetic.  He states his blood sugars are well controlled.  He does relate to numbness in both feet.  Today he complains of long, thickened painful toenails.  His pain is relieved with debridement.  He also request a prescription for diabetic shoes.  He denies any injury or trauma to his feet.    Past Medical History:   Diagnosis Date   • Blood clot in vein    • Cancer (CMS/[facility])    • Carotid stenosis    • COPD (chronic obstructive pulmonary disease) (CMS/[facility])    • DM2 (diabetes mellitus, type 2) (CMS/[facility])    • Hyperlipidemia    • Hypertension    • PVD (peripheral vascular disease) (CMS/[facility])          Past Surgical History:   Procedure Laterality Date   • CAROTID ENDARTERECTOMY Left 1980   • CHOLECYSTECTOMY     • EYE SURGERY     • PROSTATE BIOPSY           Family History   Problem Relation Age of Onset   • Heart attack Mother    • Cancer Father    • Diabetes Brother    • Kidney disease Brother        Allergies   Allergen Reactions   • Propofol        Social History     Socioeconomic History   • Marital status:      Spouse name: Not on file   • Number of children: Not on file   • Years of education: Not on file   • Highest education level: Not on file   Tobacco Use   • Smoking status: Former Smoker   • Smokeless tobacco: Never Used   Vaping Use   • Vaping Use: Never used   Substance and Sexual Activity   • Alcohol use: No   • Drug use: No   • Sexual activity: Defer         Current Outpatient Medications   Medication Sig Dispense Refill   • albuterol (PROVENTIL) (2.5 MG/3ML)  "0.083% nebulizer solution Take 2.5 mg by nebulization Every 4 (Four) Hours As Needed for Wheezing.     • amLODIPine (NORVASC) 2.5 MG tablet Take 5 mg by mouth Daily.     • arformoterol (BROVANA) 15 MCG/2ML nebulizer solution Take  by nebulization 2 (Two) Times a Day.     • aspirin 81 MG tablet Take 81 mg by mouth Daily.     • atorvastatin (LIPITOR) 40 MG tablet Take 40 mg by mouth Daily.     • budesonide (PULMICORT) 0.5 MG/2ML nebulizer solution Take 0.5 mg by nebulization Daily.     • clopidogrel (PLAVIX) 75 MG tablet Take 75 mg by mouth Daily.     • docusate sodium (COLACE) 100 MG capsule Take 100 mg by mouth 2 (Two) Times a Day.     • dorzolamide-timolol (COSOPT) 22.3-6.8 MG/ML ophthalmic solution 1 drop 2 (Two) Times a Day.     • famotidine (PEPCID) 20 MG tablet Take 20 mg by mouth 2 (Two) Times a Day.     • ferrous sulfate 325 (65 FE) MG tablet Take 325 mg by mouth Daily With Breakfast.     • insulin NPH-insulin regular (NOVOLIN 70/30) (70-30) 100 UNIT/ML injection Inject  under the skin 2 (Two) Times a Day With Meals.     • latanoprost (XALATAN) 0.005 % ophthalmic solution 1 drop Every Night.     • losartan (COZAAR) 50 MG tablet Take 50 mg by mouth Daily.     • metoprolol tartrate (LOPRESSOR) 25 MG tablet Take 25 mg by mouth 2 (Two) Times a Day.     • PARoxetine (PAXIL) 20 MG tablet Take 20 mg by mouth Every Morning.     • polyethylene glycol (MIRALAX) packet Take 17 g by mouth Daily.     • simvastatin (ZOCOR) 40 MG tablet Take 40 mg by mouth Every Night.     • spironolactone (ALDACTONE) 25 MG tablet Take 25 mg by mouth Daily.     • UltiCare Insulin Syringe 31G X 5/16\" 0.5 ML misc See Admin Instructions.     • vitamin B-12 (CYANOCOBALAMIN) 1000 MCG tablet Take 1,000 mcg by mouth Daily.     • vitamin D (ERGOCALCIFEROL) 1.25 MG (61815 UT) capsule capsule Take 50,000 Units by mouth 1 (One) Time Per Week.     • Xarelto 20 MG tablet Take 20 mg by mouth Every Evening.       No current facility-administered " "medications for this visit.       Review of Systems   Constitutional: Negative.    HENT: Negative.    Eyes: Negative.    Respiratory: Negative.    Cardiovascular: Negative.    Gastrointestinal: Negative.    Endocrine: Negative.    Genitourinary: Negative.    Musculoskeletal:        Foot pain    Skin: Negative.    Allergic/Immunologic: Negative.    Neurological: Negative.    Hematological: Negative.    Psychiatric/Behavioral: Negative.          OBJECTIVE    Pulse 74   Ht 171.5 cm (67.5\")   Wt 90.7 kg (200 lb)   SpO2 99%   BMI 30.86 kg/m²     Physical Exam  Vitals reviewed.   Constitutional:       General: He is not in acute distress.     Appearance: He is well-developed.   HENT:      Head: Normocephalic and atraumatic.      Nose: Nose normal.   Eyes:      Conjunctiva/sclera: Conjunctivae normal.      Pupils: Pupils are equal, round, and reactive to light.   Pulmonary:      Effort: Pulmonary effort is normal. No respiratory distress.      Breath sounds: No wheezing.   Musculoskeletal:         General: Tenderness present. No deformity. Normal range of motion.   Skin:     General: Skin is warm and dry.      Capillary Refill: Capillary refill takes less than 2 seconds.   Neurological:      Mental Status: He is alert and oriented to person, place, and time.   Psychiatric:         Behavior: Behavior normal.         Thought Content: Thought content normal.          Lower Extremity Exam:     Cardiovascular:    DP/PT pulses palpable b/l    CFT brisk  to all digits b/l  Skin temp is warm to warm from proximal tibia to distal digits b/l  Pedal hair growth present b/l  Musculoskeletal:  Muscle strength is 5/5 for all muscle groups tested b/l  ROM of the ankle joint is  WNL b/l  Dermatological:   Webspaces 1-4 b/l are clean, dry and intact.   No subcutaneous nodules or masses noted  b/l  No open wounds noted b/l  Nails 1-5 b/l are thickened, discolored, elongated with subungual debris.  Pain on palpation to the nail " plate  Neurological:   Protective sensation absent b/l  Sensation intact to light touch b/l   Vibratory sensation decreased to HIPJ b/l      Foot/Ankle Exam:       General:   Diabetic Foot Exam Performed            Procedures        ASSESSMENT AND PLAN    Diagnoses and all orders for this visit:    1. Encounter for diabetic foot exam (CMS/[facility]) (Primary)    2. Onychomycosis    3. Chronic toe pain, bilateral    4. Anticoagulant long-term use    5. Type 2 diabetes mellitus with peripheral neuropathy (CMS/[facility])        - A diabetic foot screening exam was performed and the patient was educated on the foot complications related to diabetes,  preventative foot care and what signs and symptoms to watch for.  Instructed to contact our office if any foot problems develop before next visit.  - Nails 1-5 bilateral were debrided in length and thickness with nail nipper and electric  to decrease fungal load and risk of infection.  - All the patients questions were answered.  - RTC 3 months or sooner if needed.              This document has been electronically signed by Irvin Steiner DPM on March 30, 2021 13:31 CDT     3/30/2021  13:31 CDT

## 2021-07-06 ENCOUNTER — OFFICE VISIT (OUTPATIENT)
Dept: PODIATRY | Facility: CLINIC | Age: 76
End: 2021-07-06

## 2021-07-06 VITALS — BODY MASS INDEX: 30.31 KG/M2 | HEIGHT: 68 IN | WEIGHT: 200 LBS | HEART RATE: 61 BPM | OXYGEN SATURATION: 98 %

## 2021-07-06 DIAGNOSIS — G89.29 CHRONIC TOE PAIN, BILATERAL: ICD-10-CM

## 2021-07-06 DIAGNOSIS — B35.1 ONYCHOMYCOSIS: Primary | ICD-10-CM

## 2021-07-06 DIAGNOSIS — E11.42 TYPE 2 DIABETES MELLITUS WITH PERIPHERAL NEUROPATHY (HCC): ICD-10-CM

## 2021-07-06 DIAGNOSIS — M79.674 CHRONIC TOE PAIN, BILATERAL: ICD-10-CM

## 2021-07-06 DIAGNOSIS — M79.675 CHRONIC TOE PAIN, BILATERAL: ICD-10-CM

## 2021-07-06 PROCEDURE — 11721 DEBRIDE NAIL 6 OR MORE: CPT | Performed by: PODIATRIST

## 2021-07-06 NOTE — PROGRESS NOTES
Sarbjit Kirkpatrick  1945  76 y.o. male   Lenka Guzman - 2/22/21  BS - 98  PER PATIENT     07/06/2021     Chief Complaint   Patient presents with   • Left Foot - diabetic foot care   • Right Foot - diabetic foot care       History of Present Illness    Sarbjit Kirkpatrick is a 76 y.o.male who presents to clinic today for diabetic foot care.     Past Medical History:   Diagnosis Date   • Blood clot in vein    • Cancer (CMS/[facility])    • Carotid stenosis    • COPD (chronic obstructive pulmonary disease) (CMS/[facility])    • DM2 (diabetes mellitus, type 2) (CMS/[facility])    • Hyperlipidemia    • Hypertension    • Onychomycosis    • PVD (peripheral vascular disease) (CMS/[facility])          Past Surgical History:   Procedure Laterality Date   • CAROTID ENDARTERECTOMY Left 1980   • CHOLECYSTECTOMY     • EYE SURGERY     • PROSTATE BIOPSY           Family History   Problem Relation Age of Onset   • Heart attack Mother    • Cancer Father    • Diabetes Brother    • Kidney disease Brother        Allergies   Allergen Reactions   • Propofol        Social History     Socioeconomic History   • Marital status:      Spouse name: Not on file   • Number of children: Not on file   • Years of education: Not on file   • Highest education level: Not on file   Tobacco Use   • Smoking status: Former Smoker   • Smokeless tobacco: Never Used   Vaping Use   • Vaping Use: Never used   Substance and Sexual Activity   • Alcohol use: No   • Drug use: No   • Sexual activity: Defer         Current Outpatient Medications   Medication Sig Dispense Refill   • albuterol (PROVENTIL) (2.5 MG/3ML) 0.083% nebulizer solution Take 2.5 mg by nebulization Every 4 (Four) Hours As Needed for Wheezing.     • amLODIPine (NORVASC) 2.5 MG tablet Take 5 mg by mouth Daily.     • arformoterol (BROVANA) 15 MCG/2ML nebulizer solution Take  by nebulization 2 (Two) Times a Day.     • aspirin 81 MG tablet Take 81 mg by mouth Daily.     • atorvastatin (LIPITOR) 40 MG tablet Take 40 mg  "by mouth Daily.     • budesonide (PULMICORT) 0.5 MG/2ML nebulizer solution Take 0.5 mg by nebulization Daily.     • clopidogrel (PLAVIX) 75 MG tablet Take 75 mg by mouth Daily.     • docusate sodium (COLACE) 100 MG capsule Take 100 mg by mouth 2 (Two) Times a Day.     • dorzolamide-timolol (COSOPT) 22.3-6.8 MG/ML ophthalmic solution 1 drop 2 (Two) Times a Day.     • famotidine (PEPCID) 20 MG tablet Take 20 mg by mouth 2 (Two) Times a Day.     • ferrous sulfate 325 (65 FE) MG tablet Take 325 mg by mouth Daily With Breakfast.     • insulin NPH-insulin regular (NOVOLIN 70/30) (70-30) 100 UNIT/ML injection Inject  under the skin 2 (Two) Times a Day With Meals.     • latanoprost (XALATAN) 0.005 % ophthalmic solution 1 drop Every Night.     • losartan (COZAAR) 50 MG tablet Take 50 mg by mouth Daily.     • metoprolol tartrate (LOPRESSOR) 25 MG tablet Take 25 mg by mouth 2 (Two) Times a Day.     • PARoxetine (PAXIL) 20 MG tablet Take 20 mg by mouth Every Morning.     • polyethylene glycol (MIRALAX) packet Take 17 g by mouth Daily.     • simvastatin (ZOCOR) 40 MG tablet Take 40 mg by mouth Every Night.     • spironolactone (ALDACTONE) 25 MG tablet Take 25 mg by mouth Daily.     • UltiCare Insulin Syringe 31G X 5/16\" 0.5 ML misc See Admin Instructions.     • vitamin B-12 (CYANOCOBALAMIN) 1000 MCG tablet Take 1,000 mcg by mouth Daily.     • vitamin D (ERGOCALCIFEROL) 1.25 MG (86283 UT) capsule capsule Take 50,000 Units by mouth 1 (One) Time Per Week.     • Xarelto 20 MG tablet Take 20 mg by mouth Every Evening.       No current facility-administered medications for this visit.       Review of Systems   Constitutional: Negative.    HENT: Negative.    Eyes: Negative.    Respiratory: Negative.    Cardiovascular: Negative.    Gastrointestinal: Negative.    Musculoskeletal:        Foot pain    Skin: Negative.    Neurological: Negative.    Psychiatric/Behavioral: Negative.          OBJECTIVE    Pulse 61   Ht 171.5 cm (67.5\")   Wt " 90.7 kg (200 lb)   SpO2 98%   BMI 30.86 kg/m²     Physical Exam  Vitals reviewed.   Constitutional:       General: He is not in acute distress.     Appearance: He is well-developed.   HENT:      Head: Normocephalic and atraumatic.      Nose: Nose normal.   Eyes:      Conjunctiva/sclera: Conjunctivae normal.      Pupils: Pupils are equal, round, and reactive to light.   Pulmonary:      Effort: Pulmonary effort is normal. No respiratory distress.      Breath sounds: No wheezing.   Musculoskeletal:         General: Tenderness present. No deformity. Normal range of motion.   Skin:     General: Skin is warm and dry.      Capillary Refill: Capillary refill takes less than 2 seconds.   Neurological:      Mental Status: He is alert and oriented to person, place, and time.   Psychiatric:         Behavior: Behavior normal.         Thought Content: Thought content normal.          Lower Extremity Exam:     Cardiovascular:    DP/PT pulses palpable b/l    CFT brisk  to all digits b/l  Skin temp is warm to warm from proximal tibia to distal digits b/l  Pedal hair growth present b/l  Musculoskeletal:  Muscle strength is 5/5 for all muscle groups tested b/l  ROM of the ankle joint is  WNL b/l  Dermatological:   Webspaces 1-4 b/l are clean, dry and intact.   No subcutaneous nodules or masses noted  b/l  No open wounds noted b/l  Nails 1-5 b/l are thickened, discolored, elongated with subungual debris.  Pain on palpation to the nail plate  Neurological:   Protective sensation absent b/l  Sensation intact to light touch b/l   Vibratory sensation decreased to HIPJ b/l      Foot/Ankle Exam:           Procedures        ASSESSMENT AND PLAN    Diagnoses and all orders for this visit:    1. Onychomycosis (Primary)    2. Chronic toe pain, bilateral    3. Type 2 diabetes mellitus with peripheral neuropathy (CMS/HCC)        - Nails 1-5 bilateral were debrided in length and thickness with nail nipper and electric  to decrease fungal  load and risk of infection.   - All the patients questions were answered.  - RTC 3 months or sooner if needed.              This document has been electronically signed by Irvin Steiner DPM on July 6, 2021 10:27 CDT     7/6/2021  10:27 CDT